# Patient Record
Sex: FEMALE | Race: WHITE
[De-identification: names, ages, dates, MRNs, and addresses within clinical notes are randomized per-mention and may not be internally consistent; named-entity substitution may affect disease eponyms.]

---

## 2018-03-09 ENCOUNTER — HOSPITAL ENCOUNTER (OUTPATIENT)
Dept: HOSPITAL 96 - M.ULTRA | Age: 83
End: 2018-03-09
Attending: NURSE PRACTITIONER
Payer: MEDICARE

## 2018-03-09 DIAGNOSIS — R60.0: ICD-10-CM

## 2018-03-09 DIAGNOSIS — M79.604: ICD-10-CM

## 2018-03-09 DIAGNOSIS — M79.605: Primary | ICD-10-CM

## 2018-10-08 ENCOUNTER — HOSPITAL ENCOUNTER (OUTPATIENT)
Dept: HOSPITAL 96 - M.RAD | Age: 83
Discharge: HOME | End: 2018-10-08
Attending: ORTHOPAEDIC SURGERY
Payer: MEDICARE

## 2018-10-08 DIAGNOSIS — Z79.899: ICD-10-CM

## 2018-10-08 DIAGNOSIS — Z79.82: ICD-10-CM

## 2018-10-08 DIAGNOSIS — M16.11: Primary | ICD-10-CM

## 2018-10-08 DIAGNOSIS — Z88.8: ICD-10-CM

## 2018-10-08 DIAGNOSIS — M25.551: ICD-10-CM

## 2018-10-24 ENCOUNTER — HOSPITAL ENCOUNTER (EMERGENCY)
Dept: HOSPITAL 96 - M.ERS | Age: 83
Discharge: HOME | End: 2018-10-24
Payer: MEDICARE

## 2018-10-24 VITALS — DIASTOLIC BLOOD PRESSURE: 62 MMHG | SYSTOLIC BLOOD PRESSURE: 137 MMHG

## 2018-10-24 VITALS — HEIGHT: 66 IN | BODY MASS INDEX: 27.8 KG/M2 | WEIGHT: 173 LBS

## 2018-10-24 DIAGNOSIS — K21.9: ICD-10-CM

## 2018-10-24 DIAGNOSIS — F41.9: Primary | ICD-10-CM

## 2018-10-24 DIAGNOSIS — E78.5: ICD-10-CM

## 2018-10-24 DIAGNOSIS — I10: ICD-10-CM

## 2018-10-24 DIAGNOSIS — Z90.49: ICD-10-CM

## 2018-10-24 DIAGNOSIS — Z88.6: ICD-10-CM

## 2018-10-24 LAB
ABSOLUTE BASOPHILS: 0 THOU/UL (ref 0–0.2)
ABSOLUTE EOSINOPHILS: 0 THOU/UL (ref 0–0.7)
ABSOLUTE MONOCYTES: 0.3 THOU/UL (ref 0–1.2)
ALBUMIN SERPL-MCNC: 3 G/DL (ref 3.4–5)
ALP SERPL-CCNC: 60 U/L (ref 46–116)
ALT SERPL-CCNC: 22 U/L (ref 30–65)
ANION GAP SERPL CALC-SCNC: 7 MMOL/L (ref 7–16)
APTT BLD: 24.9 SECONDS (ref 25–31.3)
AST SERPL-CCNC: 13 U/L (ref 15–37)
BASOPHILS NFR BLD AUTO: 0.5 %
BILIRUB SERPL-MCNC: 0.6 MG/DL
BILIRUB UR-MCNC: NEGATIVE MG/DL
BUN SERPL-MCNC: 21 MG/DL (ref 7–18)
CALCIUM SERPL-MCNC: 9.7 MG/DL (ref 8.5–10.1)
CHLORIDE SERPL-SCNC: 104 MMOL/L (ref 98–107)
CO2 SERPL-SCNC: 27 MMOL/L (ref 21–32)
COLOR UR: YELLOW
CREAT SERPL-MCNC: 1 MG/DL (ref 0.6–1.3)
EOSINOPHIL NFR BLD: 0.5 %
GLUCOSE SERPL-MCNC: 149 MG/DL (ref 70–99)
GRANULOCYTES NFR BLD MANUAL: 81.9 %
HCT VFR BLD CALC: 38.7 % (ref 37–47)
HGB BLD-MCNC: 13.1 GM/DL (ref 12–15)
INR PPP: 1
KETONES UR STRIP-MCNC: NEGATIVE MG/DL
LYMPHOCYTES # BLD: 0.5 THOU/UL (ref 0.8–5.3)
LYMPHOCYTES NFR BLD AUTO: 11.2 %
MCH RBC QN AUTO: 29.6 PG (ref 26–34)
MCHC RBC AUTO-ENTMCNC: 33.9 G/DL (ref 28–37)
MCV RBC: 87.2 FL (ref 80–100)
MONOCYTES NFR BLD: 5.9 %
MPV: 7.7 FL. (ref 7.2–11.1)
NEUTROPHILS # BLD: 3.9 THOU/UL (ref 1.6–8.1)
NT-PRO BRAIN NAT PEPTIDE: 176 PG/ML (ref ?–300)
NUCLEATED RBCS: 0 /100WBC
PLATELET COUNT*: 157 THOU/UL (ref 150–400)
POTASSIUM SERPL-SCNC: 3 MMOL/L (ref 3.5–5.1)
PROT SERPL-MCNC: 5.8 G/DL (ref 6.4–8.2)
PROT UR QL STRIP: NEGATIVE
PROTHROMBIN TIME: 10.5 SECONDS (ref 9.2–11.5)
RBC # BLD AUTO: 4.43 MIL/UL (ref 4.2–5)
RBC # UR STRIP: NEGATIVE /UL
RDW-CV: 14.7 % (ref 10.5–14.5)
SODIUM SERPL-SCNC: 138 MMOL/L (ref 136–145)
SP GR UR STRIP: 1.01 (ref 1–1.03)
TROPONIN-I LEVEL: <0.06 NG/ML (ref ?–0.06)
URINE CLARITY: CLEAR
URINE GLUCOSE-RANDOM: NEGATIVE
URINE LEUKOCYTES-REFLEX: NEGATIVE
URINE NITRITE-REFLEX: NEGATIVE
UROBILINOGEN UR STRIP-ACNC: 0.2 E.U./DL (ref 0.2–1)
WBC # BLD AUTO: 4.7 THOU/UL (ref 4–11)

## 2018-10-24 NOTE — EKG
[place] [address]
Phone:  [phone]                     ELECTROCARDIOGRAM REPORT      
_______________________________________________________________________________
 
Name:       ALMAS MOCTEZUMA                Room:                      [facility]#:  [id number]      Account #:      [id number]  
Admission:  10/24/18     Attend Phys:                         
Discharge:  10/24/18     Date of Birth:  [birth year]  
         Report #: [id number]
    [id number]
_______________________________________________________________________________
THIS REPORT FOR:  //name//                      
 
                         [facility] ED
                                       
Test Date:    2018-10-24               Test Time:    13:16:03
Pat Name:     ALMAS MOCTEZUMA            Department:   
Patient ID:   [id number]            Room:          
Gender:       F                        Technician:   TSMARI
:          [id number]               Requested By: Nitin Glover
Order Number: [id number] MD:   Tip Blount
                                 Measurements
Intervals                              Axis          
Rate:         64                       P:            31
IL:           193                      QRS:          -33
QRSD:         105                      T:            45
QT:           375                                    
QTc:          387                                    
                           Interpretive Statements
Sinus rhythm
Left axis deviation
Compared to ECG 2008 08:57:32
Left-axis deviation now present
Sinus bradycardia no longer present
 
 
Electronically Signed On 10- 17:03:47 CDT by Tip Blount
[email or web address]
 
 
 
 
 
 
 
 
 
 
 
 
 
 
 
 
  <ELECTRONICALLY SIGNED>
                                           By: Tip Blount MD, [facility]      
  10/24/18     1703
D: 10/24/18 1316   _____________________________________
T: 10/24/18 1316   Tip Blount MD, FACC        /EPI

## 2018-11-10 ENCOUNTER — HOSPITAL ENCOUNTER (EMERGENCY)
Dept: HOSPITAL 96 - M.ERS | Age: 83
Discharge: HOME | End: 2018-11-10
Payer: MEDICARE

## 2018-11-10 VITALS — SYSTOLIC BLOOD PRESSURE: 139 MMHG | DIASTOLIC BLOOD PRESSURE: 57 MMHG

## 2018-11-10 VITALS — HEIGHT: 66 IN | WEIGHT: 187 LBS | BODY MASS INDEX: 30.05 KG/M2

## 2018-11-10 DIAGNOSIS — Z88.8: ICD-10-CM

## 2018-11-10 DIAGNOSIS — I10: ICD-10-CM

## 2018-11-10 DIAGNOSIS — K21.9: ICD-10-CM

## 2018-11-10 DIAGNOSIS — M25.551: Primary | ICD-10-CM

## 2018-11-10 DIAGNOSIS — E78.5: ICD-10-CM

## 2018-11-10 DIAGNOSIS — Z90.49: ICD-10-CM

## 2018-11-10 DIAGNOSIS — E87.6: ICD-10-CM

## 2018-11-10 LAB
ABSOLUTE BASOPHILS: 0 THOU/UL (ref 0–0.2)
ABSOLUTE EOSINOPHILS: 0 THOU/UL (ref 0–0.7)
ABSOLUTE MONOCYTES: 0.3 THOU/UL (ref 0–1.2)
ALBUMIN SERPL-MCNC: 3.1 G/DL (ref 3.4–5)
ALP SERPL-CCNC: 75 U/L (ref 46–116)
ALT SERPL-CCNC: 17 U/L (ref 30–65)
ANION GAP SERPL CALC-SCNC: 6 MMOL/L (ref 7–16)
AST SERPL-CCNC: 14 U/L (ref 15–37)
BASOPHILS NFR BLD AUTO: 0.6 %
BILIRUB SERPL-MCNC: 0.7 MG/DL
BILIRUB UR-MCNC: (no result) MG/DL
BUN SERPL-MCNC: 16 MG/DL (ref 7–18)
CALCIUM SERPL-MCNC: 9.8 MG/DL (ref 8.5–10.1)
CHLORIDE SERPL-SCNC: 107 MMOL/L (ref 98–107)
CO2 SERPL-SCNC: 30 MMOL/L (ref 21–32)
COLOR UR: YELLOW
CREAT SERPL-MCNC: 1 MG/DL (ref 0.6–1.3)
EOSINOPHIL NFR BLD: 0.9 %
GLUCOSE SERPL-MCNC: 106 MG/DL (ref 70–99)
GRANULOCYTES NFR BLD MANUAL: 72.8 %
HCT VFR BLD CALC: 37 % (ref 37–47)
HGB BLD-MCNC: 12.3 GM/DL (ref 12–15)
KETONES UR STRIP-MCNC: NEGATIVE MG/DL
LYMPHOCYTES # BLD: 0.7 THOU/UL (ref 0.8–5.3)
LYMPHOCYTES NFR BLD AUTO: 18.8 %
MCH RBC QN AUTO: 29.2 PG (ref 26–34)
MCHC RBC AUTO-ENTMCNC: 33.3 G/DL (ref 28–37)
MCV RBC: 87.7 FL (ref 80–100)
MONOCYTES NFR BLD: 6.9 %
MPV: 7.5 FL. (ref 7.2–11.1)
NEUTROPHILS # BLD: 2.6 THOU/UL (ref 1.6–8.1)
NUCLEATED RBCS: 0 /100WBC
PLATELET COUNT*: 206 THOU/UL (ref 150–400)
POTASSIUM SERPL-SCNC: 3.2 MMOL/L (ref 3.5–5.1)
PROT SERPL-MCNC: 5.8 G/DL (ref 6.4–8.2)
PROT UR QL STRIP: NEGATIVE
RBC # BLD AUTO: 4.22 MIL/UL (ref 4.2–5)
RBC # UR STRIP: NEGATIVE /UL
RDW-CV: 14.2 % (ref 10.5–14.5)
SODIUM SERPL-SCNC: 143 MMOL/L (ref 136–145)
SP GR UR STRIP: 1.02 (ref 1–1.03)
URINE CLARITY: CLEAR
URINE GLUCOSE-RANDOM: NEGATIVE
URINE LEUKOCYTES-REFLEX: NEGATIVE
URINE NITRITE-REFLEX: NEGATIVE
UROBILINOGEN UR STRIP-ACNC: 1 E.U./DL (ref 0.2–1)
WBC # BLD AUTO: 3.6 THOU/UL (ref 4–11)

## 2018-11-19 ENCOUNTER — HOSPITAL ENCOUNTER (OUTPATIENT)
Dept: HOSPITAL 96 - M.CT | Age: 83
End: 2018-11-19
Attending: FAMILY MEDICINE
Payer: MEDICARE

## 2018-11-19 DIAGNOSIS — Z90.49: ICD-10-CM

## 2018-11-19 DIAGNOSIS — K44.9: Primary | ICD-10-CM

## 2018-11-19 DIAGNOSIS — I99.8: ICD-10-CM

## 2018-11-19 DIAGNOSIS — R10.9: ICD-10-CM

## 2018-11-25 ENCOUNTER — HOSPITAL ENCOUNTER (EMERGENCY)
Dept: HOSPITAL 96 - M.ERS | Age: 83
Discharge: HOME | End: 2018-11-25
Payer: MEDICARE

## 2018-11-25 VITALS — WEIGHT: 170 LBS | BODY MASS INDEX: 27.32 KG/M2 | HEIGHT: 66 IN

## 2018-11-25 VITALS — SYSTOLIC BLOOD PRESSURE: 176 MMHG | DIASTOLIC BLOOD PRESSURE: 71 MMHG

## 2018-11-25 DIAGNOSIS — I10: ICD-10-CM

## 2018-11-25 DIAGNOSIS — Z90.49: ICD-10-CM
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
## 2018-11-25 DIAGNOSIS — Z88.8: ICD-10-CM

## 2018-11-25 DIAGNOSIS — E78.5: ICD-10-CM

## 2018-11-25 DIAGNOSIS — M54.17: ICD-10-CM
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
## 2018-11-25 DIAGNOSIS — M25.551: Primary | ICD-10-CM

## 2018-11-25 DIAGNOSIS — R51: ICD-10-CM
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
## 2018-11-25 LAB
ABSOLUTE BASOPHILS: 0 THOU/UL (ref 0–0.2)
ABSOLUTE EOSINOPHILS: 0.1 THOU/UL (ref 0–0.7)
ABSOLUTE MONOCYTES: 0.3 THOU/UL (ref 0–1.2)
ALBUMIN SERPL-MCNC: 2.8 G/DL (ref 3.4–5)
ALP SERPL-CCNC: 83 U/L (ref 46–116)
ALT SERPL-CCNC: 15 U/L (ref 30–65)
ANION GAP SERPL CALC-SCNC: 6 MMOL/L (ref 7–16)
AST SERPL-CCNC: 11 U/L (ref 15–37)
BASOPHILS NFR BLD AUTO: 0.7 %
BILIRUB SERPL-MCNC: 0.5 MG/DL
BILIRUB UR-MCNC: NEGATIVE MG/DL
BUN SERPL-MCNC: 15 MG/DL (ref 7–18)
CALCIUM SERPL-MCNC: 9.8 MG/DL (ref 8.5–10.1)
CHLORIDE SERPL-SCNC: 104 MMOL/L (ref 98–107)
CO2 SERPL-SCNC: 29 MMOL/L (ref 21–32)
COLOR UR: YELLOW
CREAT SERPL-MCNC: 0.9 MG/DL (ref 0.6–1.3)
EOSINOPHIL NFR BLD: 1.3 %
GLUCOSE SERPL-MCNC: 96 MG/DL (ref 70–99)
GRANULOCYTES NFR BLD MANUAL: 70.9 %
HCT VFR BLD CALC: 36.2 % (ref 37–47)
HGB BLD-MCNC: 12.2 GM/DL (ref 12–15)
KETONES UR STRIP-MCNC: NEGATIVE MG/DL
LYMPHOCYTES # BLD: 0.8 THOU/UL (ref 0.8–5.3)
LYMPHOCYTES NFR BLD AUTO: 19.5 %
MCH RBC QN AUTO: 29.4 PG (ref 26–34)
MCHC RBC AUTO-ENTMCNC: 33.8 G/DL (ref 28–37)
MCV RBC: 86.9 FL (ref 80–100)
MONOCYTES NFR BLD: 7.6 %
MPV: 7.2 FL. (ref 7.2–11.1)
NEUTROPHILS # BLD: 2.8 THOU/UL (ref 1.6–8.1)
NUCLEATED RBCS: 0 /100WBC
PLATELET COUNT*: 210 THOU/UL (ref 150–400)
POTASSIUM SERPL-SCNC: 3.7 MMOL/L (ref 3.5–5.1)
PROT SERPL-MCNC: 5.7 G/DL (ref 6.4–8.2)
PROT UR QL STRIP: NEGATIVE
RBC # BLD AUTO: 4.16 MIL/UL (ref 4.2–5)
RBC # UR STRIP: NEGATIVE /UL
RDW-CV: 14.3 % (ref 10.5–14.5)
SODIUM SERPL-SCNC: 139 MMOL/L (ref 136–145)
SP GR UR STRIP: 1.01 (ref 1–1.03)
URINE CLARITY: CLEAR
URINE GLUCOSE-RANDOM: NEGATIVE
URINE LEUKOCYTES-REFLEX: NEGATIVE
URINE NITRITE-REFLEX: NEGATIVE
UROBILINOGEN UR STRIP-ACNC: 0.2 E.U./DL (ref 0.2–1)
WBC # BLD AUTO: 3.9 THOU/UL (ref 4–11)

## 2018-11-26 NOTE — EKG
[place] [address]
Phone:  [phone]                     ELECTROCARDIOGRAM REPORT      
_______________________________________________________________________________
 
Name:       RHIANNAALAMS WEI                Room:                      [facility]#:  [id number]      Account #:      [id number]  
Admission:  18     Attend Phys:                         
Discharge:  18     Date of Birth:  [birth year]  
         Report #: [id number]
    [id number]
_______________________________________________________________________________
THIS REPORT FOR:  //name//                      
 
                         [facility] ED
                                       
Test Date:    2018               Test Time:    19:49:16
Pat Name:     ALMAS MOCTEZUMA            Department:   
Patient ID:   [id number]            Room:          
Gender:       F                        Technician:   AL
:          1931               Requested By: Martha Juarez
Order Number: [id number] MD:   Robinson Trujillo
                                 Measurements
Intervals                              Axis          
Rate:         61                       P:            53
ID:           200                      QRS:          -31
QRSD:         102                      T:            30
QT:           372                                    
QTc:          375                                    
                           Interpretive Statements
Sinus rhythm
Left axis deviation
Minimal ST elevation, lateral leads
Compared to ECG 10/24/2018 13:16:03
ST (T wave) deviation now present
 
Electronically Signed On 2018 16:36:29 CST by Robinson Trujillo
[email or web address]
 
 
 
 
 
 
 
 
 
 
 
 
 
 
 
 
 
  <ELECTRONICALLY SIGNED>
                                           By: Robinson Trujillo MD, [facility]     
  18     1636
D: 18   _____________________________________
T: 18   Robinson Trujillo MD, FACC       /EPI

## 2018-12-18 ENCOUNTER — HOSPITAL ENCOUNTER (INPATIENT)
Dept: HOSPITAL 96 - M.REH | Age: 83
LOS: 17 days | Discharge: NURSING, REHAB OR LONG-TERM CARE | DRG: 948 | End: 2019-01-04
Attending: PHYSICAL MEDICINE & REHABILITATION | Admitting: PHYSICAL MEDICINE & REHABILITATION
Payer: MEDICARE

## 2018-12-18 VITALS — SYSTOLIC BLOOD PRESSURE: 140 MMHG | DIASTOLIC BLOOD PRESSURE: 60 MMHG

## 2018-12-18 VITALS — DIASTOLIC BLOOD PRESSURE: 75 MMHG | SYSTOLIC BLOOD PRESSURE: 103 MMHG

## 2018-12-18 VITALS — BODY MASS INDEX: 24.91 KG/M2 | HEIGHT: 65.98 IN | WEIGHT: 155 LBS

## 2018-12-18 DIAGNOSIS — E83.42: ICD-10-CM

## 2018-12-18 DIAGNOSIS — Z91.81: ICD-10-CM

## 2018-12-18 DIAGNOSIS — R11.0: ICD-10-CM

## 2018-12-18 DIAGNOSIS — E78.5: ICD-10-CM

## 2018-12-18 DIAGNOSIS — K57.90: ICD-10-CM

## 2018-12-18 DIAGNOSIS — Z92.3: ICD-10-CM

## 2018-12-18 DIAGNOSIS — M16.11: ICD-10-CM

## 2018-12-18 DIAGNOSIS — K59.09: ICD-10-CM

## 2018-12-18 DIAGNOSIS — Z88.8: ICD-10-CM

## 2018-12-18 DIAGNOSIS — K21.9: ICD-10-CM

## 2018-12-18 DIAGNOSIS — D50.9: ICD-10-CM

## 2018-12-18 DIAGNOSIS — F41.9: ICD-10-CM

## 2018-12-18 DIAGNOSIS — M17.11: ICD-10-CM

## 2018-12-18 DIAGNOSIS — R53.1: Primary | ICD-10-CM

## 2018-12-18 DIAGNOSIS — I10: ICD-10-CM

## 2018-12-18 DIAGNOSIS — Z79.899: ICD-10-CM

## 2018-12-18 DIAGNOSIS — M47.25: ICD-10-CM

## 2018-12-19 VITALS — DIASTOLIC BLOOD PRESSURE: 68 MMHG | SYSTOLIC BLOOD PRESSURE: 144 MMHG

## 2018-12-19 VITALS — DIASTOLIC BLOOD PRESSURE: 67 MMHG | SYSTOLIC BLOOD PRESSURE: 127 MMHG

## 2018-12-19 LAB
ANION GAP SERPL CALC-SCNC: 7 MMOL/L (ref 7–16)
BUN SERPL-MCNC: 21 MG/DL (ref 7–18)
CALCIUM SERPL-MCNC: 9.6 MG/DL (ref 8.5–10.1)
CHLORIDE SERPL-SCNC: 100 MMOL/L (ref 98–107)
CO2 SERPL-SCNC: 29 MMOL/L (ref 21–32)
CREAT SERPL-MCNC: 1.1 MG/DL (ref 0.6–1.3)
GLUCOSE SERPL-MCNC: 98 MG/DL (ref 70–99)
HCT VFR BLD CALC: 36.5 % (ref 37–47)
HGB BLD-MCNC: 12.2 GM/DL (ref 12–15)
MCH RBC QN AUTO: 29.3 PG (ref 26–34)
MCHC RBC AUTO-ENTMCNC: 33.5 G/DL (ref 28–37)
MCV RBC: 87.4 FL (ref 80–100)
MPV: 8 FL. (ref 7.2–11.1)
PLATELET COUNT*: 209 THOU/UL (ref 150–400)
POTASSIUM SERPL-SCNC: 3.8 MMOL/L (ref 3.5–5.1)
RBC # BLD AUTO: 4.18 MIL/UL (ref 4.2–5)
RDW-CV: 14.5 % (ref 10.5–14.5)
SODIUM SERPL-SCNC: 136 MMOL/L (ref 136–145)
WBC # BLD AUTO: 3.1 THOU/UL (ref 4–11)

## 2018-12-20 VITALS — DIASTOLIC BLOOD PRESSURE: 68 MMHG | SYSTOLIC BLOOD PRESSURE: 136 MMHG

## 2018-12-20 VITALS — DIASTOLIC BLOOD PRESSURE: 55 MMHG | SYSTOLIC BLOOD PRESSURE: 112 MMHG

## 2018-12-21 VITALS — DIASTOLIC BLOOD PRESSURE: 61 MMHG | SYSTOLIC BLOOD PRESSURE: 115 MMHG

## 2018-12-21 VITALS — SYSTOLIC BLOOD PRESSURE: 133 MMHG | DIASTOLIC BLOOD PRESSURE: 63 MMHG

## 2018-12-22 VITALS — SYSTOLIC BLOOD PRESSURE: 97 MMHG | DIASTOLIC BLOOD PRESSURE: 46 MMHG

## 2018-12-22 VITALS — SYSTOLIC BLOOD PRESSURE: 136 MMHG | DIASTOLIC BLOOD PRESSURE: 54 MMHG

## 2018-12-23 VITALS — SYSTOLIC BLOOD PRESSURE: 139 MMHG | DIASTOLIC BLOOD PRESSURE: 65 MMHG

## 2018-12-23 VITALS — SYSTOLIC BLOOD PRESSURE: 142 MMHG | DIASTOLIC BLOOD PRESSURE: 67 MMHG

## 2018-12-23 LAB
ANION GAP SERPL CALC-SCNC: 6 MMOL/L (ref 7–16)
BUN SERPL-MCNC: 23 MG/DL (ref 7–18)
CALCIUM SERPL-MCNC: 9.3 MG/DL (ref 8.5–10.1)
CHLORIDE SERPL-SCNC: 95 MMOL/L (ref 98–107)
CO2 SERPL-SCNC: 31 MMOL/L (ref 21–32)
CREAT SERPL-MCNC: 1.4 MG/DL (ref 0.6–1.3)
GLUCOSE SERPL-MCNC: 102 MG/DL (ref 70–99)
HCT VFR BLD CALC: 32.7 % (ref 37–47)
HGB BLD-MCNC: 11.2 GM/DL (ref 12–15)
MAGNESIUM SERPL-MCNC: 1.6 MG/DL (ref 1.8–2.4)
MCH RBC QN AUTO: 29.5 PG (ref 26–34)
MCHC RBC AUTO-ENTMCNC: 34.2 G/DL (ref 28–37)
MCV RBC: 86.2 FL (ref 80–100)
MPV: 7.6 FL. (ref 7.2–11.1)
PLATELET COUNT*: 218 THOU/UL (ref 150–400)
POTASSIUM SERPL-SCNC: 3.2 MMOL/L (ref 3.5–5.1)
RBC # BLD AUTO: 3.8 MIL/UL (ref 4.2–5)
RDW-CV: 14.2 % (ref 10.5–14.5)
SODIUM SERPL-SCNC: 132 MMOL/L (ref 136–145)
WBC # BLD AUTO: 3.9 THOU/UL (ref 4–11)

## 2018-12-24 VITALS — DIASTOLIC BLOOD PRESSURE: 69 MMHG | SYSTOLIC BLOOD PRESSURE: 152 MMHG

## 2018-12-24 VITALS — DIASTOLIC BLOOD PRESSURE: 73 MMHG | SYSTOLIC BLOOD PRESSURE: 168 MMHG

## 2018-12-25 VITALS — DIASTOLIC BLOOD PRESSURE: 58 MMHG | SYSTOLIC BLOOD PRESSURE: 125 MMHG

## 2018-12-25 VITALS — SYSTOLIC BLOOD PRESSURE: 145 MMHG | DIASTOLIC BLOOD PRESSURE: 61 MMHG

## 2018-12-25 LAB
ABSOLUTE BASOPHILS: 0 THOU/UL (ref 0–0.2)
ABSOLUTE EOSINOPHILS: 0.1 THOU/UL (ref 0–0.7)
ABSOLUTE MONOCYTES: 0.3 THOU/UL (ref 0–1.2)
ANION GAP SERPL CALC-SCNC: 5 MMOL/L (ref 7–16)
BASOPHILS NFR BLD AUTO: 1 %
BUN SERPL-MCNC: 18 MG/DL (ref 7–18)
CALCIUM SERPL-MCNC: 9.8 MG/DL (ref 8.5–10.1)
CHLORIDE SERPL-SCNC: 98 MMOL/L (ref 98–107)
CO2 SERPL-SCNC: 29 MMOL/L (ref 21–32)
CREAT SERPL-MCNC: 1.1 MG/DL (ref 0.6–1.3)
EOSINOPHIL NFR BLD: 1.7 %
GLUCOSE SERPL-MCNC: 88 MG/DL (ref 70–99)
GRANULOCYTES NFR BLD MANUAL: 63 %
HCT VFR BLD CALC: 33.4 % (ref 37–47)
HGB BLD-MCNC: 11.6 GM/DL (ref 12–15)
IRON SERPL-MCNC: 46 UG/DL (ref 50–175)
LYMPHOCYTES # BLD: 0.7 THOU/UL (ref 0.8–5.3)
LYMPHOCYTES NFR BLD AUTO: 23.2 %
MAGNESIUM SERPL-MCNC: 1.8 MG/DL (ref 1.8–2.4)
MCH RBC QN AUTO: 30 PG (ref 26–34)
MCHC RBC AUTO-ENTMCNC: 34.7 G/DL (ref 28–37)
MCV RBC: 86.7 FL (ref 80–100)
MONOCYTES NFR BLD: 11.1 %
MPV: 7.7 FL. (ref 7.2–11.1)
NEUTROPHILS # BLD: 1.9 THOU/UL (ref 1.6–8.1)
NUCLEATED RBCS: 0 /100WBC
PLATELET COUNT*: 235 THOU/UL (ref 150–400)
POTASSIUM SERPL-SCNC: 3.7 MMOL/L (ref 3.5–5.1)
RBC # BLD AUTO: 3.85 MIL/UL (ref 4.2–5)
RDW-CV: 14 % (ref 10.5–14.5)
SAO2 % BLD FROM PO2: 18 % (ref 20–39)
SODIUM SERPL-SCNC: 132 MMOL/L (ref 136–145)
WBC # BLD AUTO: 3 THOU/UL (ref 4–11)

## 2018-12-26 VITALS — DIASTOLIC BLOOD PRESSURE: 66 MMHG | SYSTOLIC BLOOD PRESSURE: 132 MMHG

## 2018-12-26 VITALS — SYSTOLIC BLOOD PRESSURE: 142 MMHG | DIASTOLIC BLOOD PRESSURE: 65 MMHG

## 2018-12-27 VITALS — SYSTOLIC BLOOD PRESSURE: 119 MMHG | DIASTOLIC BLOOD PRESSURE: 55 MMHG

## 2018-12-27 VITALS — SYSTOLIC BLOOD PRESSURE: 142 MMHG | DIASTOLIC BLOOD PRESSURE: 64 MMHG

## 2018-12-28 VITALS — DIASTOLIC BLOOD PRESSURE: 58 MMHG | SYSTOLIC BLOOD PRESSURE: 126 MMHG

## 2018-12-28 VITALS — DIASTOLIC BLOOD PRESSURE: 67 MMHG | SYSTOLIC BLOOD PRESSURE: 157 MMHG

## 2018-12-29 VITALS — DIASTOLIC BLOOD PRESSURE: 58 MMHG | SYSTOLIC BLOOD PRESSURE: 130 MMHG

## 2018-12-29 VITALS — DIASTOLIC BLOOD PRESSURE: 51 MMHG | SYSTOLIC BLOOD PRESSURE: 119 MMHG

## 2018-12-30 VITALS — SYSTOLIC BLOOD PRESSURE: 126 MMHG | DIASTOLIC BLOOD PRESSURE: 56 MMHG

## 2018-12-30 VITALS — DIASTOLIC BLOOD PRESSURE: 57 MMHG | SYSTOLIC BLOOD PRESSURE: 133 MMHG

## 2018-12-30 VITALS — DIASTOLIC BLOOD PRESSURE: 72 MMHG | SYSTOLIC BLOOD PRESSURE: 127 MMHG

## 2018-12-31 VITALS — DIASTOLIC BLOOD PRESSURE: 72 MMHG | SYSTOLIC BLOOD PRESSURE: 117 MMHG

## 2018-12-31 VITALS — SYSTOLIC BLOOD PRESSURE: 126 MMHG | DIASTOLIC BLOOD PRESSURE: 64 MMHG

## 2019-01-01 VITALS — SYSTOLIC BLOOD PRESSURE: 140 MMHG | DIASTOLIC BLOOD PRESSURE: 62 MMHG

## 2019-01-01 VITALS — DIASTOLIC BLOOD PRESSURE: 60 MMHG | SYSTOLIC BLOOD PRESSURE: 138 MMHG

## 2019-01-02 VITALS — SYSTOLIC BLOOD PRESSURE: 140 MMHG | DIASTOLIC BLOOD PRESSURE: 67 MMHG

## 2019-01-02 VITALS — DIASTOLIC BLOOD PRESSURE: 63 MMHG | SYSTOLIC BLOOD PRESSURE: 145 MMHG

## 2019-01-03 VITALS — SYSTOLIC BLOOD PRESSURE: 130 MMHG | DIASTOLIC BLOOD PRESSURE: 71 MMHG

## 2019-01-03 VITALS — DIASTOLIC BLOOD PRESSURE: 65 MMHG | SYSTOLIC BLOOD PRESSURE: 140 MMHG

## 2019-01-04 VITALS — DIASTOLIC BLOOD PRESSURE: 63 MMHG | SYSTOLIC BLOOD PRESSURE: 137 MMHG
